# Patient Record
Sex: MALE | Race: WHITE | NOT HISPANIC OR LATINO | URBAN - METROPOLITAN AREA
[De-identification: names, ages, dates, MRNs, and addresses within clinical notes are randomized per-mention and may not be internally consistent; named-entity substitution may affect disease eponyms.]

---

## 2024-10-28 ENCOUNTER — APPOINTMENT (OUTPATIENT)
Dept: URGENT CARE | Age: 44
End: 2024-10-28

## 2025-06-11 ENCOUNTER — OFFICE VISIT (OUTPATIENT)
Dept: FAMILY MEDICINE CLINIC | Facility: HOSPITAL | Age: 45
End: 2025-06-11
Payer: COMMERCIAL

## 2025-06-11 VITALS
HEIGHT: 68 IN | HEART RATE: 67 BPM | BODY MASS INDEX: 30.49 KG/M2 | WEIGHT: 201.2 LBS | SYSTOLIC BLOOD PRESSURE: 124 MMHG | TEMPERATURE: 98.3 F | DIASTOLIC BLOOD PRESSURE: 74 MMHG | OXYGEN SATURATION: 99 %

## 2025-06-11 DIAGNOSIS — Z12.11 SCREENING FOR COLORECTAL CANCER: ICD-10-CM

## 2025-06-11 DIAGNOSIS — Z12.12 SCREENING FOR COLORECTAL CANCER: ICD-10-CM

## 2025-06-11 DIAGNOSIS — Z76.89 ENCOUNTER TO ESTABLISH CARE: Primary | ICD-10-CM

## 2025-06-11 DIAGNOSIS — Z00.00 ANNUAL PHYSICAL EXAM: ICD-10-CM

## 2025-06-11 DIAGNOSIS — Z13.29 SCREENING FOR THYROID DISORDER: ICD-10-CM

## 2025-06-11 DIAGNOSIS — Z13.6 SCREENING FOR CARDIOVASCULAR CONDITION: ICD-10-CM

## 2025-06-11 DIAGNOSIS — Z13.0 SCREENING, ANEMIA, DEFICIENCY, IRON: ICD-10-CM

## 2025-06-11 PROCEDURE — 99386 PREV VISIT NEW AGE 40-64: CPT

## 2025-06-11 NOTE — PATIENT INSTRUCTIONS
"Patient Education     Routine physical for adults   The Basics   Written by the doctors and editors at [facility]   What is a physical? -- A physical is a routine visit, or \"check-up,\" with your doctor. You might also hear it called a \"wellness visit\" or \"preventive visit.\"  During each visit, the doctor will:   Ask about your physical and mental health   Ask about your habits, behaviors, and lifestyle   Do an exam   Give you vaccines if needed   Talk to you about any medicines you take   Give advice about your health   Answer your questions  Getting regular check-ups is an important part of taking care of your health. It can help your doctor find and treat any problems you have. But it's also important for preventing health problems.  A routine physical is different from a \"sick visit.\" A sick visit is when you see a doctor because of a health concern or problem. Since physicals are scheduled ahead of time, you can think about what you want to ask the doctor.  How often should I get a physical? -- It depends on your age and health. In general, for people age 21 years and older:   If you are younger than 50 years, you might be able to get a physical every 3 years.   If you are 50 years or older, your doctor might recommend a physical every year.  If you have an ongoing health condition, like diabetes or high blood pressure, your doctor will probably want to see you more often.  What happens during a physical? -- In general, each visit will include:   Physical exam - The doctor or nurse will check your height, weight, heart rate, and blood pressure. They will also look at your eyes and ears. They will ask about how you are feeling and whether you have any symptoms that bother you.   Medicines - It's a good idea to bring a list of all the medicines you take to each doctor visit. Your doctor will talk to you about your medicines and answer any questions. Tell them if you are having any side effects that bother you. You " "should also tell them if you are having trouble paying for any of your medicines.   Habits and behaviors - This includes:   Your diet   Your exercise habits   Whether you smoke, drink alcohol, or use drugs   Whether you are sexually active   Whether you feel safe at home  Your doctor will talk to you about things you can do to improve your health and lower your risk of health problems. They will also offer help and support. For example, if you want to quit smoking, they can give you advice and might prescribe medicines. If you want to improve your diet or get more physical activity, they can help you with this, too.   Lab tests, if needed - The tests you get will depend on your age and situation. For example, your doctor might want to check your:   Cholesterol   Blood sugar   Iron level   Vaccines - The recommended vaccines will depend on your age, health, and what vaccines you already had. Vaccines are very important because they can prevent certain serious or deadly infections.   Discussion of screening - \"Screening\" means checking for diseases or other health problems before they cause symptoms. Your doctor can recommend screening based on your age, risk, and preferences. This might include tests to check for:   Cancer, such as breast, prostate, cervical, ovarian, colorectal, prostate, lung, or skin cancer   Sexually transmitted infections, such as chlamydia and gonorrhea   Mental health conditions like depression and anxiety  Your doctor will talk to you about the different types of screening tests. They can help you decide which screenings to have. They can also explain what the results might mean.   Answering questions - The physical is a good time to ask the doctor or nurse questions about your health. If needed, they can refer you to other doctors or specialists, too.  Adults older than 65 years often need other care, too. As you get older, your doctor will talk to you about:   How to prevent falling at " home   Hearing or vision tests   Memory testing   How to take your medicines safely   Making sure that you have the help and support you need at home  All topics are updated as new evidence becomes available and our peer review process is complete.  This topic retrieved from [employer] on: May 02, 2024.  Topic 922953 Version 1.0  Release: 32.4.3 - C32.122  © 2024 [employer], Inc. and/or its affiliates. All rights reserved.  Consumer Information Use and Disclaimer   Disclaimer: This generalized information is a limited summary of diagnosis, treatment, and/or medication information. It is not meant to be comprehensive and should be used as a tool to help the user understand and/or assess potential diagnostic and treatment options. It does NOT include all information about conditions, treatments, medications, side effects, or risks that may apply to a specific patient. It is not intended to be medical advice or a substitute for the medical advice, diagnosis, or treatment of a health care provider based on the health care provider's examination and assessment of a patient's specific and unique circumstances. Patients must speak with a health care provider for complete information about their health, medical questions, and treatment options, including any risks or benefits regarding use of medications. This information does not endorse any treatments or medications as safe, effective, or approved for treating a specific patient. Up[employer]Date, Inc. and its affiliates disclaim any warranty or liability relating to this information or the use thereof.The use of this information is governed by the Terms of Use, available at https://www.woltersMacrotherapyuwer.com/en/know/clinical-effectiveness-terms. 2024© UpToDate, Inc. and its affiliates and/or licensors. All rights reserved.  Copyright   © 2024 Up[employer]Date, Inc. and/or its affiliates. All rights reserved.

## 2025-06-11 NOTE — PROGRESS NOTES
Adult Annual Physical  Name: Rinku Patricio      : 1980      MRN: [id number]  Encounter Provider: Shagufta Philippe DO  Encounter Date: 2025   Encounter department: [facility] [address]    :  Assessment & Plan  Encounter to establish care         Annual physical exam         Screening for cardiovascular condition    Orders:    Lipid panel; Future    Hemoglobin A1C; Future    Comprehensive metabolic panel; Future    Screening for colorectal cancer    Orders:    Ambulatory Referral to Gastroenterology; Future    Screening, anemia, deficiency, iron    Orders:    CBC and differential; Future    Screening for thyroid disorder    Orders:    TSH, 3rd generation with Free T4 reflex; Future        Annual Physical Plan       History of Present Illness     Adult Annual Physical:  Patient presents for annual physical.  No other concerns.  .     Diet and Physical Activity:  - Diet/Nutrition: well balanced diet.  - Exercise: walking. physical job    Depression Screening:  - PHQ-2 Score: 0    General Health:  - Sleep: 7-8 hours of sleep on average.  - Hearing: normal hearing bilateral ears. loud noises at work  - Vision: wears glasses. readers  - Dental: regular dental visits.     Health:  - History of STDs: no.   - Urinary symptoms: none.       Preventative Screening  Colon: Last colonoscopy denies, agreeable to colonoscopy    Denies Family history of breast cancer, colon cancer, prostate cancer  Labs: Hgb [id number], FLP ordered    Immunizations  Last TDAP     BMI Counseling: Body mass index is 30.59 kg/m². The BMI is above normal. Exercise recommendations include moderate aerobic physical activity for 150 minutes/week.    Wt Readings from Last 3 Encounters:   25 91.3 kg (201 lb 3.2 oz)       PHQ-2/9 Depression Screening    Little interest or pleasure in doing things: 0 - not at all  Feeling down, depressed, or hopeless: 0 - not at all  PHQ-2 Score: 0  PHQ-2 Interpretation:  "Negative depression screen             Family History  denies    Allergies  denies    Social History  Tobacco - quit 1 year ago, originally started age 17, smoking 1PPD  Alcohol - once a month  Illicit Drugs - vaping        Review of Systems   Respiratory:  Negative for shortness of breath.    Cardiovascular:  Negative for chest pain.   Gastrointestinal:  Negative for abdominal pain, nausea and vomiting.   Skin:  Negative for rash.   Allergic/Immunologic: Negative for environmental allergies and food allergies.   Psychiatric/Behavioral:  Negative for sleep disturbance.      Medications Ordered Prior to Encounter[1]   Social History[2]    Objective   /74   Pulse 67   Temp 98.3 °F (36.8 °C) (Tympanic)   Ht 5' 8\" (1.727 m)   Wt 91.3 kg (201 lb 3.2 oz)   SpO2 99%   BMI 30.59 kg/m²     Physical Exam  Vitals reviewed.   Constitutional:       General: He is not in acute distress.     Appearance: Normal appearance. He is obese. He is not ill-appearing.   HENT:      Head: Normocephalic and atraumatic.      Right Ear: Tympanic membrane, ear canal and external ear normal. There is no impacted cerumen.      Left Ear: Tympanic membrane, ear canal and external ear normal. There is no impacted cerumen.      Nose: Nose normal.     Cardiovascular:      Rate and Rhythm: Normal rate and regular rhythm.      Heart sounds: Normal heart sounds.   Pulmonary:      Effort: Pulmonary effort is normal.      Breath sounds: Normal breath sounds.   Abdominal:      Palpations: Abdomen is soft.      Tenderness: There is no abdominal tenderness. There is no guarding or rebound.     Neurological:      Mental Status: He is alert.     Psychiatric:         Mood and Affect: Mood normal.         Behavior: Behavior normal.           Shagufta Philippe DO  Family Medicine         [1]   No current outpatient medications on file prior to visit.     No current facility-administered medications on file prior to visit.   [2]   Social " History  Tobacco Use    Smoking status: Never    Smokeless tobacco: Never   Vaping Use    Vaping status: Every Day    Substances: Nicotine   Substance and Sexual Activity    Alcohol use: Yes     Comment: On Occassion    Drug use: Never